# Patient Record
Sex: FEMALE | Race: WHITE | NOT HISPANIC OR LATINO | URBAN - METROPOLITAN AREA
[De-identification: names, ages, dates, MRNs, and addresses within clinical notes are randomized per-mention and may not be internally consistent; named-entity substitution may affect disease eponyms.]

---

## 2017-11-28 PROBLEM — I05.0 MITRAL VALVE STENOSIS, MILD: Status: ACTIVE | Noted: 2017-11-28

## 2018-04-24 PROBLEM — L57.0 AK (ACTINIC KERATOSIS): Status: ACTIVE | Noted: 2018-04-24

## 2018-07-16 PROBLEM — I51.89 DIASTOLIC DYSFUNCTION: Status: ACTIVE | Noted: 2018-07-16

## 2018-09-06 ENCOUNTER — OFFICE VISIT (OUTPATIENT)
Dept: CARDIOLOGY | Facility: MEDICAL CENTER | Age: 68
End: 2018-09-06
Payer: MEDICARE

## 2018-09-06 VITALS
HEIGHT: 72 IN | DIASTOLIC BLOOD PRESSURE: 70 MMHG | HEART RATE: 72 BPM | OXYGEN SATURATION: 97 % | WEIGHT: 293 LBS | SYSTOLIC BLOOD PRESSURE: 116 MMHG | BODY MASS INDEX: 39.68 KG/M2

## 2018-09-06 DIAGNOSIS — I51.89 DIASTOLIC DYSFUNCTION: ICD-10-CM

## 2018-09-06 DIAGNOSIS — R06.09 DYSPNEA ON EFFORT: ICD-10-CM

## 2018-09-06 DIAGNOSIS — G47.33 OSA (OBSTRUCTIVE SLEEP APNEA): ICD-10-CM

## 2018-09-06 DIAGNOSIS — I10 ESSENTIAL HYPERTENSION: ICD-10-CM

## 2018-09-06 DIAGNOSIS — J45.20 INTERMITTENT ASTHMA, UNSPECIFIED ASTHMA SEVERITY, UNSPECIFIED WHETHER COMPLICATED: ICD-10-CM

## 2018-09-06 PROBLEM — I05.0 MITRAL VALVE STENOSIS, MILD: Status: RESOLVED | Noted: 2017-11-28 | Resolved: 2018-09-06

## 2018-09-06 PROCEDURE — 99204 OFFICE O/P NEW MOD 45 MIN: CPT | Performed by: INTERNAL MEDICINE

## 2018-09-06 RX ORDER — MELATONIN 10 MG
CAPSULE ORAL
COMMUNITY
End: 2022-10-11

## 2018-09-06 RX ORDER — OLMESARTAN MEDOXOMIL AND HYDROCHLOROTHIAZIDE 40/12.5 40; 12.5 MG/1; MG/1
1 TABLET ORAL DAILY
Qty: 30 TAB | Refills: 11 | Status: SHIPPED | OUTPATIENT
Start: 2018-09-06 | End: 2019-01-15 | Stop reason: SDUPTHER

## 2018-09-06 ASSESSMENT — ENCOUNTER SYMPTOMS
PALPITATIONS: 0
BACK PAIN: 1
NERVOUS/ANXIOUS: 0
COUGH: 0
BLURRED VISION: 0
HEADACHES: 0
DIZZINESS: 0
BRUISES/BLEEDS EASILY: 0
FEVER: 0
HEARTBURN: 0
NAUSEA: 0
EYE DISCHARGE: 0
SHORTNESS OF BREATH: 1
MYALGIAS: 0
DEPRESSION: 0
CHILLS: 0
PND: 0

## 2018-09-06 NOTE — LETTER
[facility]   [address], [address]  [place] [address]  Phone: [phone]  Fax: [phone]              Felicia Alvarez  1950    Encounter Date: 9/6/2018    Kevin Mejias M.D.          PROGRESS NOTE:  No chief complaint on file.      Subjective:   Felicia Alvarez is a 67 y.o. female who presents today in consultation at the request of Dr. Nix for severe exertional dyspnea.  This patient was hospitalized in June 2018 in [facility].  This was for shortness of breath.  Lexiscan stress test showed no abnormalities.  Echocardiogram demonstrated normal left ventricular systolic function, mild to moderate elevation of pulmonary artery pressure of 35-40 mmHg and some degree of diastolic dysfunction of the left ventricle.    She also has a remote sleep study that apparently was abnormal with no follow-up.  She sleeps 12 hours a day and often takes a nap after that.  She sleeps alone.  She also has some degree of chronic bronchitis.  She is tried short acting bronchodilators with temporary relief of shortness of breath.  Currently taking Serevent.  Effort dyspnea, severe ,continues.  She is being aggressively treated for hypertension with multiple drugs including beta blockers, currently carvedilol 12.5 mg twice daily.    She reports dyspnea simply walking on flat ground.  No orthopnea or PND    Past Medical History:   Diagnosis Date   • High cholesterol    • Hypertension    • Psychiatric disorder    • Type II or unspecified type diabetes mellitus without mention of complication, not stated as uncontrolled      Past Surgical History:   Procedure Laterality Date   • GYN SURGERY     • HYSTERECTOMY LAPAROSCOPY     • OTHER ABDOMINAL SURGERY       History reviewed. No pertinent family history.  Social History     Social History   • Marital status: Single     Spouse name: N/A   • Number of children: N/A   • Years of education: N/A     Occupational  History   • Not on file.     Social History Main Topics   • Smoking status: Never Smoker   • Smokeless tobacco: Never Used   • Alcohol use No   • Drug use: No   • Sexual activity: Not on file     Other Topics Concern   • Not on file     Social History Narrative   • No narrative on file     Allergies   Allergen Reactions   • Amoxicillin Rash and Swelling     Tongue swelling   • Nitrofurantoin Nausea   • Sulfa Drugs Vomiting     Outpatient Encounter Prescriptions as of 9/6/2018   Medication Sig Dispense Refill   • FIBER PO Take  by mouth.     • MAGNESIUM PO Take  by mouth.     • Melatonin 10 MG Cap Take  by mouth.     • olmesartan-hydrochlorothiazide (BENICAR HCT) 40-12.5 MG per tablet Take 1 Tab by mouth every day. 30 Tab 11   • insulin glargine (LANTUS) 100 UNIT/ML Solution INJECT 75 UNITS EVERY EVENING AS INSTRUCTED 100 mL 3   • gabapentin (NEURONTIN) 100 MG Cap TAKE ONE CAPSULE BY MOUTH TWICE DAILY 180 Cap 3   • carvedilol (COREG) 12.5 MG Tab Take 1 Tab by mouth 2 times a day, with meals. 180 Tab 3   • carvedilol (COREG) 6.25 MG Tab      • NOVOLOG, insulin aspart, (NOVOLOG FLEXPEN) 100 UNIT/ML Solution Pen-injector injection Inject 4 Units as instructed 3 times a day before meals. 15 PEN 3   • lovastatin (MEVACOR) 40 MG tablet TAKE ONE AND ONE-HALF TABLETS EVERY EVENING 135 Tab 3   • spironolactone (ALDACTONE) 25 MG Tab TAKE 1 TABLET DAILY 90 Tab 3   • metformin (GLUCOPHAGE) 1000 MG tablet Take 1 Tab by mouth 2 times a day, with meals. 180 Tab 3   • omeprazole (PRILOSEC) 20 MG delayed-release capsule Take 1 Cap by mouth every day. 90 Cap 3   • fluoxetine (PROZAC) 20 MG Cap TAKE 2 CAPSULES DAILY IN THE MORNING 180 Cap 3   • aspirin EC (ECOTRIN) 81 MG Tablet Delayed Response Take 1 Tab by mouth every day. 90 Tab 3   • insulin aspart (NOVOLOG) 100 UNIT/ML Solution Inject 45 Units as instructed 3 times a day before meals. (Patient taking differently: Inject  as instructed 3 times a day before meals. Sliding scale) 10  "mL 3   • Lifitegrast (XIIDRA) 5 % Solution by Ophthalmic route.     • salmeterol (SEREVENT) 50 MCG/DOSE AEROSOL POWDER, BREATH ACTIVATED Inhale 1 Puff by mouth 2 times a day. 1 Each 3   • [DISCONTINUED] losartan-hydrochlorothiazide (HYZAAR) 100-12.5 MG per tablet Take 1 Tab by mouth every day.     • [DISCONTINUED] fluticasone (FLOVENT HFA) 220 MCG/ACT Aerosol 2 puffs bid during periods of asthma symptoms 1 Inhaler 3   • [DISCONTINUED] fluticasone (FLONASE) 50 MCG/ACT nasal spray Spray 1 Spray in nose every day. 16 g 3   • Insulin Syringes, Disposable, U-100 1 ML Misc Use as directed qid 360 Each 3   • albuterol 108 (90 BASE) MCG/ACT Aero Soln inhalation aerosol Inhale 2 Puffs by mouth every four hours as needed for Shortness of Breath. 8.5 g 0   • [DISCONTINUED] Calcium Carbonate (CALCIUM 600 PO) Take  by mouth.     • [DISCONTINUED] Cholecalciferol (VITAMIN D) 2000 UNIT TABS Take  by mouth every day.       No facility-administered encounter medications on file as of 9/6/2018.      Review of Systems   Constitutional: Positive for malaise/fatigue. Negative for chills and fever.   Eyes: Negative for blurred vision and discharge.   Respiratory: Positive for shortness of breath. Negative for cough.    Cardiovascular: Negative for chest pain, palpitations, leg swelling and PND.   Gastrointestinal: Negative for heartburn and nausea.   Genitourinary: Negative for dysuria and urgency.   Musculoskeletal: Positive for back pain. Negative for myalgias.   Skin: Negative for itching and rash.   Neurological: Negative for dizziness and headaches.   Endo/Heme/Allergies: Negative for environmental allergies. Does not bruise/bleed easily.   Psychiatric/Behavioral: Negative for depression. The patient is not nervous/anxious.         Objective:   /70   Pulse 72   Ht 1.854 m (6' 1\")   Wt (!) 145.2 kg (320 lb)   SpO2 97%   BMI 42.22 kg/m²      Physical Exam   Constitutional: She is oriented to person, place, and time.   Obese " pleasant lady who is a good historian   Neck: No JVD present.   Cardiovascular: Normal rate, regular rhythm and intact distal pulses.    Abdominal: Soft. Bowel sounds are normal.   Musculoskeletal: She exhibits no edema.   Neurological: She is alert and oriented to person, place, and time.   Skin: Skin is warm and dry.       Assessment:     1. Diastolic dysfunction     2. Dyspnea on effort  PULMONARY FUNCTION TESTS Test requested: Complete Pulmonary Function Test    REFERRAL TO SLEEP STUDIES   3. JENNY (obstructive sleep apnea)  PULMONARY FUNCTION TESTS Test requested: Complete Pulmonary Function Test    REFERRAL TO SLEEP STUDIES   4. Intermittent asthma, unspecified asthma severity, unspecified whether complicated  PULMONARY FUNCTION TESTS Test requested: Complete Pulmonary Function Test    REFERRAL TO SLEEP STUDIES   5. Essential hypertension         Medical Decision Making:  Today's Assessment / Status / Plan:   Effort dyspnea is probably multifactorial, including bronchospastic lung disease, probable untreated sleep apnea, possibly some diastolic dysfunction of left ventricle, and obesity.    I recommend attempts to take her off carvedilol completely.  In that regard, I have asked her to reduce carvedilol to 6.25 mg twice daily until she sees Dr. Nix.  I have stopped losartan HCT.  I have ordered Benicar HCT 40/12.5 which is more potent than losartan combination.  Of ordered pulmonary function tests.  I have ordered pulmonary referral for sleep study.    She will be following up with her primary care physician.  We will be glad to see her in the future, depending on how she responds to the above recommended course of action including medication change.  I would recommend a strong attempt to stop carvedilol if possible.    Thank you for this consultation.      John Nix M.D.  [address] [place] [id number]  VIA In Basket

## 2018-09-06 NOTE — PROGRESS NOTES
No chief complaint on file.      Subjective:   Felicia Alvarez is a 67 y.o. female who presents today in consultation at the request of Dr. Nix for severe exertional dyspnea.  This patient was hospitalized in June 2018 in [facility].  This was for shortness of breath.  Lexiscan stress test showed no abnormalities.  Echocardiogram demonstrated normal left ventricular systolic function, mild to moderate elevation of pulmonary artery pressure of 35-40 mmHg and some degree of diastolic dysfunction of the left ventricle.    She also has a remote sleep study that apparently was abnormal with no follow-up.  She sleeps 12 hours a day and often takes a nap after that.  She sleeps alone.  She also has some degree of chronic bronchitis.  She is tried short acting bronchodilators with temporary relief of shortness of breath.  Currently taking Serevent.  Effort dyspnea, severe ,continues.  She is being aggressively treated for hypertension with multiple drugs including beta blockers, currently carvedilol 12.5 mg twice daily.    She reports dyspnea simply walking on flat ground.  No orthopnea or PND    Past Medical History:   Diagnosis Date   • High cholesterol    • Hypertension    • Psychiatric disorder    • Type II or unspecified type diabetes mellitus without mention of complication, not stated as uncontrolled      Past Surgical History:   Procedure Laterality Date   • GYN SURGERY     • HYSTERECTOMY LAPAROSCOPY     • OTHER ABDOMINAL SURGERY       History reviewed. No pertinent family history.  Social History     Social History   • Marital status: Single     Spouse name: N/A   • Number of children: N/A   • Years of education: N/A     Occupational History   • Not on file.     Social History Main Topics   • Smoking status: Never Smoker   • Smokeless tobacco: Never Used   • Alcohol use No   • Drug use: No   • Sexual activity: Not on file     Other Topics Concern   • Not on file     Social History Narrative   • No  narrative on file     Allergies   Allergen Reactions   • Amoxicillin Rash and Swelling     Tongue swelling   • Nitrofurantoin Nausea   • Sulfa Drugs Vomiting     Outpatient Encounter Prescriptions as of 9/6/2018   Medication Sig Dispense Refill   • FIBER PO Take  by mouth.     • MAGNESIUM PO Take  by mouth.     • Melatonin 10 MG Cap Take  by mouth.     • olmesartan-hydrochlorothiazide (BENICAR HCT) 40-12.5 MG per tablet Take 1 Tab by mouth every day. 30 Tab 11   • insulin glargine (LANTUS) 100 UNIT/ML Solution INJECT 75 UNITS EVERY EVENING AS INSTRUCTED 100 mL 3   • gabapentin (NEURONTIN) 100 MG Cap TAKE ONE CAPSULE BY MOUTH TWICE DAILY 180 Cap 3   • carvedilol (COREG) 12.5 MG Tab Take 1 Tab by mouth 2 times a day, with meals. 180 Tab 3   • carvedilol (COREG) 6.25 MG Tab      • NOVOLOG, insulin aspart, (NOVOLOG FLEXPEN) 100 UNIT/ML Solution Pen-injector injection Inject 4 Units as instructed 3 times a day before meals. 15 PEN 3   • lovastatin (MEVACOR) 40 MG tablet TAKE ONE AND ONE-HALF TABLETS EVERY EVENING 135 Tab 3   • spironolactone (ALDACTONE) 25 MG Tab TAKE 1 TABLET DAILY 90 Tab 3   • metformin (GLUCOPHAGE) 1000 MG tablet Take 1 Tab by mouth 2 times a day, with meals. 180 Tab 3   • omeprazole (PRILOSEC) 20 MG delayed-release capsule Take 1 Cap by mouth every day. 90 Cap 3   • fluoxetine (PROZAC) 20 MG Cap TAKE 2 CAPSULES DAILY IN THE MORNING 180 Cap 3   • aspirin EC (ECOTRIN) 81 MG Tablet Delayed Response Take 1 Tab by mouth every day. 90 Tab 3   • insulin aspart (NOVOLOG) 100 UNIT/ML Solution Inject 45 Units as instructed 3 times a day before meals. (Patient taking differently: Inject  as instructed 3 times a day before meals. Sliding scale) 10 mL 3   • Lifitegrast (XIIDRA) 5 % Solution by Ophthalmic route.     • salmeterol (SEREVENT) 50 MCG/DOSE AEROSOL POWDER, BREATH ACTIVATED Inhale 1 Puff by mouth 2 times a day. 1 Each 3   • [DISCONTINUED] losartan-hydrochlorothiazide (HYZAAR) 100-12.5 MG per tablet Take  "1 Tab by mouth every day.     • [DISCONTINUED] fluticasone (FLOVENT HFA) 220 MCG/ACT Aerosol 2 puffs bid during periods of asthma symptoms 1 Inhaler 3   • [DISCONTINUED] fluticasone (FLONASE) 50 MCG/ACT nasal spray Spray 1 Spray in nose every day. 16 g 3   • Insulin Syringes, Disposable, U-100 1 ML Misc Use as directed qid 360 Each 3   • albuterol 108 (90 BASE) MCG/ACT Aero Soln inhalation aerosol Inhale 2 Puffs by mouth every four hours as needed for Shortness of Breath. 8.5 g 0   • [DISCONTINUED] Calcium Carbonate (CALCIUM 600 PO) Take  by mouth.     • [DISCONTINUED] Cholecalciferol (VITAMIN D) 2000 UNIT TABS Take  by mouth every day.       No facility-administered encounter medications on file as of 9/6/2018.      Review of Systems   Constitutional: Positive for malaise/fatigue. Negative for chills and fever.   Eyes: Negative for blurred vision and discharge.   Respiratory: Positive for shortness of breath. Negative for cough.    Cardiovascular: Negative for chest pain, palpitations, leg swelling and PND.   Gastrointestinal: Negative for heartburn and nausea.   Genitourinary: Negative for dysuria and urgency.   Musculoskeletal: Positive for back pain. Negative for myalgias.   Skin: Negative for itching and rash.   Neurological: Negative for dizziness and headaches.   Endo/Heme/Allergies: Negative for environmental allergies. Does not bruise/bleed easily.   Psychiatric/Behavioral: Negative for depression. The patient is not nervous/anxious.         Objective:   /70   Pulse 72   Ht 1.854 m (6' 1\")   Wt (!) 145.2 kg (320 lb)   SpO2 97%   BMI 42.22 kg/m²     Physical Exam   Constitutional: She is oriented to person, place, and time.   Obese pleasant lady who is a good historian   Neck: No JVD present.   Cardiovascular: Normal rate, regular rhythm and intact distal pulses.    Abdominal: Soft. Bowel sounds are normal.   Musculoskeletal: She exhibits no edema.   Neurological: She is alert and oriented to " person, place, and time.   Skin: Skin is warm and dry.       Assessment:     1. Diastolic dysfunction     2. Dyspnea on effort  PULMONARY FUNCTION TESTS Test requested: Complete Pulmonary Function Test    REFERRAL TO SLEEP STUDIES   3. JENNY (obstructive sleep apnea)  PULMONARY FUNCTION TESTS Test requested: Complete Pulmonary Function Test    REFERRAL TO SLEEP STUDIES   4. Intermittent asthma, unspecified asthma severity, unspecified whether complicated  PULMONARY FUNCTION TESTS Test requested: Complete Pulmonary Function Test    REFERRAL TO SLEEP STUDIES   5. Essential hypertension         Medical Decision Making:  Today's Assessment / Status / Plan:   Effort dyspnea is probably multifactorial, including bronchospastic lung disease, probable untreated sleep apnea, possibly some diastolic dysfunction of left ventricle, and obesity.    I recommend attempts to take her off carvedilol completely.  In that regard, I have asked her to reduce carvedilol to 6.25 mg twice daily until she sees Dr. Nix.  I have stopped losartan HCT.  I have ordered Benicar HCT 40/12.5 which is more potent than losartan combination.  Of ordered pulmonary function tests.  I have ordered pulmonary referral for sleep study.    She will be following up with her primary care physician.  We will be glad to see her in the future, depending on how she responds to the above recommended course of action including medication change.  I would recommend a strong attempt to stop carvedilol if possible.    Thank you for this consultation.

## 2018-09-07 ENCOUNTER — TELEPHONE (OUTPATIENT)
Dept: CARDIOLOGY | Facility: MEDICAL CENTER | Age: 68
End: 2018-09-07

## 2018-09-07 NOTE — TELEPHONE ENCOUNTER
PAR sent to patient's plan:  Felicia Alvarez (Key: [id number])       Status   Sent to Jenny   Next Steps   The plan will fax you a determination, typically within 1 to 5 business days.  How do I follow up?   DrugOlmesartan Medoxomil-HCTZ 40-12.5MG tablets   FormAetna Medicare Coverage Determination FormDO NOT USE FOR [employer] OR [employer] MEMBERS. Prescription Drug Coverage Determination for [employer] MEDICARE members [phone]

## 2018-10-17 PROBLEM — Z86.010 HISTORY OF COLON POLYPS: Status: ACTIVE | Noted: 2018-10-17

## 2018-10-17 PROBLEM — I34.0 MILD MITRAL REGURGITATION: Status: ACTIVE | Noted: 2018-10-17

## 2018-10-17 PROBLEM — E73.9 LACTOSE INTOLERANCE: Status: ACTIVE | Noted: 2018-10-17

## 2019-01-17 PROBLEM — G47.33 OBSTRUCTIVE SLEEP APNEA HYPOPNEA, MILD: Status: ACTIVE | Noted: 2019-01-17

## 2019-01-17 PROBLEM — I51.89 DIASTOLIC DYSFUNCTION: Status: RESOLVED | Noted: 2018-07-16 | Resolved: 2019-01-17

## 2020-09-28 PROBLEM — M17.12 OSTEOARTHRITIS OF LEFT KNEE: Status: ACTIVE | Noted: 2020-09-28

## 2020-10-20 PROBLEM — M25.562 PATELLOFEMORAL ARTHRALGIA OF LEFT KNEE: Status: ACTIVE | Noted: 2020-10-20

## 2021-02-10 PROBLEM — L50.1 IDIOPATHIC URTICARIA: Status: ACTIVE | Noted: 2021-02-10

## 2021-03-08 PROBLEM — I48.19 PERSISTENT ATRIAL FIBRILLATION (HCC): Status: ACTIVE | Noted: 2021-03-08

## 2021-05-17 PROBLEM — D63.8 ANEMIA OF CHRONIC DISEASE: Status: ACTIVE | Noted: 2021-05-17

## 2021-06-01 PROBLEM — Z91.148 HX OF MEDICATION NONCOMPLIANCE: Status: ACTIVE | Noted: 2021-06-01

## 2022-01-23 PROBLEM — I34.1 MITRAL VALVE PROLAPSE: Status: ACTIVE | Noted: 2022-01-23

## 2022-01-28 PROBLEM — R00.0 TACHYCARDIA: Status: ACTIVE | Noted: 2022-01-28

## 2022-01-28 PROBLEM — I48.0 PAROXYSMAL A-FIB (HCC): Status: ACTIVE | Noted: 2022-01-28

## 2022-07-22 PROBLEM — I27.21 PULMONARY ARTERY HYPERTENSION (HCC): Status: ACTIVE | Noted: 2022-07-22

## 2022-09-09 PROBLEM — D63.8 ANEMIA OF CHRONIC DISEASE: Status: RESOLVED | Noted: 2021-05-17 | Resolved: 2022-09-09

## 2022-10-13 PROBLEM — I48.19 PERSISTENT ATRIAL FIBRILLATION (HCC): Status: RESOLVED | Noted: 2021-03-08 | Resolved: 2022-10-13

## 2022-10-13 PROBLEM — N20.0 NEPHROLITHIASIS: Status: ACTIVE | Noted: 2022-10-13

## 2022-10-27 PROBLEM — D50.9 NORMOCYTIC HYPOCHROMIC ANEMIA: Status: ACTIVE | Noted: 2022-10-27

## 2022-10-27 PROBLEM — J96.11 CHRONIC HYPOXEMIC RESPIRATORY FAILURE (HCC): Chronic | Status: ACTIVE | Noted: 2019-01-17

## 2022-10-27 PROBLEM — E66.2 EXTREME OBESITY WITH ALVEOLAR HYPOVENTILATION (HCC): Status: ACTIVE | Noted: 2022-09-20

## 2022-10-27 PROBLEM — Z88.0 HISTORY OF ALLERGY TO PENICILLIN: Status: ACTIVE | Noted: 2022-09-20

## 2022-10-27 PROBLEM — I50.41 CHF NYHA CLASS III, ACUTE, COMBINED (HCC): Status: ACTIVE | Noted: 2022-10-27

## 2022-10-27 PROBLEM — R91.8 PULMONARY NODULES: Chronic | Status: ACTIVE | Noted: 2022-10-27

## 2022-10-27 PROBLEM — I48.0 PAROXYSMAL A-FIB (HCC): Chronic | Status: ACTIVE | Noted: 2022-01-28

## 2022-10-28 PROBLEM — I27.20 PULMONARY HYPERTENSION (HCC): Status: ACTIVE | Noted: 2022-10-28
